# Patient Record
Sex: MALE | Race: WHITE | NOT HISPANIC OR LATINO
[De-identification: names, ages, dates, MRNs, and addresses within clinical notes are randomized per-mention and may not be internally consistent; named-entity substitution may affect disease eponyms.]

---

## 2018-09-26 PROBLEM — Z00.00 ENCOUNTER FOR PREVENTIVE HEALTH EXAMINATION: Status: ACTIVE | Noted: 2018-09-26

## 2018-10-22 ENCOUNTER — APPOINTMENT (OUTPATIENT)
Dept: CARDIOLOGY | Facility: CLINIC | Age: 43
End: 2018-10-22
Payer: COMMERCIAL

## 2018-10-22 ENCOUNTER — NON-APPOINTMENT (OUTPATIENT)
Age: 43
End: 2018-10-22

## 2018-10-22 VITALS
DIASTOLIC BLOOD PRESSURE: 70 MMHG | HEART RATE: 106 BPM | SYSTOLIC BLOOD PRESSURE: 140 MMHG | HEIGHT: 75 IN | BODY MASS INDEX: 29.22 KG/M2 | WEIGHT: 235 LBS

## 2018-10-22 DIAGNOSIS — Z82.49 FAMILY HISTORY OF ISCHEMIC HEART DISEASE AND OTHER DISEASES OF THE CIRCULATORY SYSTEM: ICD-10-CM

## 2018-10-22 DIAGNOSIS — R01.1 CARDIAC MURMUR, UNSPECIFIED: ICD-10-CM

## 2018-10-22 DIAGNOSIS — Z83.3 FAMILY HISTORY OF DIABETES MELLITUS: ICD-10-CM

## 2018-10-22 DIAGNOSIS — R94.31 ABNORMAL ELECTROCARDIOGRAM [ECG] [EKG]: ICD-10-CM

## 2018-10-22 DIAGNOSIS — Z83.438 FAMILY HISTORY OF OTHER DISORDER OF LIPOPROTEIN METABOLISM AND OTHER LIPIDEMIA: ICD-10-CM

## 2018-10-22 DIAGNOSIS — Z82.3 FAMILY HISTORY OF STROKE: ICD-10-CM

## 2018-10-22 DIAGNOSIS — Z86.79 PERSONAL HISTORY OF OTHER DISEASES OF THE CIRCULATORY SYSTEM: ICD-10-CM

## 2018-10-22 DIAGNOSIS — Z78.9 OTHER SPECIFIED HEALTH STATUS: ICD-10-CM

## 2018-10-22 DIAGNOSIS — Z82.0 FAMILY HISTORY OF EPILEPSY AND OTHER DISEASES OF THE NERVOUS SYSTEM: ICD-10-CM

## 2018-10-22 DIAGNOSIS — Z87.891 PERSONAL HISTORY OF NICOTINE DEPENDENCE: ICD-10-CM

## 2018-10-22 PROCEDURE — 93000 ELECTROCARDIOGRAM COMPLETE: CPT

## 2018-10-22 PROCEDURE — 99243 OFF/OP CNSLTJ NEW/EST LOW 30: CPT

## 2018-10-22 RX ORDER — AMLODIPINE BESYLATE 5 MG/1
5 TABLET ORAL DAILY
Refills: 0 | Status: ACTIVE | COMMUNITY

## 2018-10-22 RX ORDER — OMEPRAZOLE 20 MG/1
20 CAPSULE, DELAYED RELEASE ORAL
Refills: 0 | Status: ACTIVE | COMMUNITY

## 2018-11-06 ENCOUNTER — APPOINTMENT (OUTPATIENT)
Dept: CARDIOLOGY | Facility: CLINIC | Age: 43
End: 2018-11-06
Payer: COMMERCIAL

## 2018-11-06 PROCEDURE — 93306 TTE W/DOPPLER COMPLETE: CPT

## 2018-11-06 PROCEDURE — 93015 CV STRESS TEST SUPVJ I&R: CPT

## 2018-11-08 ENCOUNTER — TRANSCRIPTION ENCOUNTER (OUTPATIENT)
Age: 43
End: 2018-11-08

## 2018-11-26 ENCOUNTER — APPOINTMENT (OUTPATIENT)
Dept: CARDIOLOGY | Facility: CLINIC | Age: 43
End: 2018-11-26
Payer: COMMERCIAL

## 2018-11-26 VITALS
HEART RATE: 70 BPM | BODY MASS INDEX: 27.19 KG/M2 | WEIGHT: 235 LBS | SYSTOLIC BLOOD PRESSURE: 150 MMHG | HEIGHT: 78 IN | DIASTOLIC BLOOD PRESSURE: 82 MMHG

## 2018-11-26 PROCEDURE — 99213 OFFICE O/P EST LOW 20 MIN: CPT

## 2019-03-20 ENCOUNTER — TRANSCRIPTION ENCOUNTER (OUTPATIENT)
Age: 44
End: 2019-03-20

## 2019-10-14 ENCOUNTER — APPOINTMENT (OUTPATIENT)
Dept: RADIOLOGY | Facility: CLINIC | Age: 44
End: 2019-10-14
Payer: COMMERCIAL

## 2019-10-14 PROCEDURE — 71046 X-RAY EXAM CHEST 2 VIEWS: CPT

## 2019-11-20 ENCOUNTER — MEDICATION RENEWAL (OUTPATIENT)
Age: 44
End: 2019-11-20

## 2019-12-02 ENCOUNTER — APPOINTMENT (OUTPATIENT)
Dept: CARDIOLOGY | Facility: CLINIC | Age: 44
End: 2019-12-02

## 2020-01-06 ENCOUNTER — NON-APPOINTMENT (OUTPATIENT)
Age: 45
End: 2020-01-06

## 2020-01-06 ENCOUNTER — APPOINTMENT (OUTPATIENT)
Dept: CARDIOLOGY | Facility: CLINIC | Age: 45
End: 2020-01-06
Payer: COMMERCIAL

## 2020-01-06 VITALS
HEIGHT: 75 IN | DIASTOLIC BLOOD PRESSURE: 70 MMHG | WEIGHT: 238 LBS | BODY MASS INDEX: 29.59 KG/M2 | SYSTOLIC BLOOD PRESSURE: 138 MMHG | HEART RATE: 87 BPM | OXYGEN SATURATION: 97 %

## 2020-01-06 PROCEDURE — 93000 ELECTROCARDIOGRAM COMPLETE: CPT

## 2020-01-06 PROCEDURE — 99214 OFFICE O/P EST MOD 30 MIN: CPT

## 2020-01-06 RX ORDER — RAMIPRIL 10 MG/1
10 CAPSULE ORAL
Qty: 180 | Refills: 3 | Status: DISCONTINUED | COMMUNITY
End: 2020-01-06

## 2020-01-06 RX ORDER — LOSARTAN POTASSIUM 50 MG/1
50 TABLET, FILM COATED ORAL DAILY
Qty: 90 | Refills: 1 | Status: ACTIVE | COMMUNITY

## 2020-03-31 ENCOUNTER — APPOINTMENT (OUTPATIENT)
Dept: DISASTER EMERGENCY | Facility: CLINIC | Age: 45
End: 2020-03-31

## 2022-08-05 ENCOUNTER — APPOINTMENT (OUTPATIENT)
Dept: ULTRASOUND IMAGING | Facility: CLINIC | Age: 47
End: 2022-08-05

## 2022-08-05 PROCEDURE — 76700 US EXAM ABDOM COMPLETE: CPT

## 2022-08-05 PROCEDURE — 93880 EXTRACRANIAL BILAT STUDY: CPT

## 2022-11-14 ENCOUNTER — NON-APPOINTMENT (OUTPATIENT)
Age: 47
End: 2022-11-14

## 2022-11-15 ENCOUNTER — APPOINTMENT (OUTPATIENT)
Dept: OPHTHALMOLOGY | Facility: CLINIC | Age: 47
End: 2022-11-15

## 2022-11-15 ENCOUNTER — NON-APPOINTMENT (OUTPATIENT)
Age: 47
End: 2022-11-15

## 2022-11-15 PROCEDURE — 92002 INTRM OPH EXAM NEW PATIENT: CPT

## 2023-01-05 ENCOUNTER — APPOINTMENT (OUTPATIENT)
Dept: INFECTIOUS DISEASE | Facility: CLINIC | Age: 48
End: 2023-01-05
Payer: COMMERCIAL

## 2023-01-05 VITALS
TEMPERATURE: 97.7 F | BODY MASS INDEX: 29.84 KG/M2 | HEIGHT: 75 IN | DIASTOLIC BLOOD PRESSURE: 82 MMHG | SYSTOLIC BLOOD PRESSURE: 158 MMHG | WEIGHT: 240 LBS

## 2023-01-05 DIAGNOSIS — R74.8 ABNORMAL LEVELS OF OTHER SERUM ENZYMES: ICD-10-CM

## 2023-01-05 DIAGNOSIS — R76.8 OTHER SPECIFIED ABNORMAL IMMUNOLOGICAL FINDINGS IN SERUM: ICD-10-CM

## 2023-01-05 DIAGNOSIS — Z11.59 ENCOUNTER FOR SCREENING FOR OTHER VIRAL DISEASES: ICD-10-CM

## 2023-01-05 DIAGNOSIS — Z87.19 PERSONAL HISTORY OF OTHER DISEASES OF THE DIGESTIVE SYSTEM: ICD-10-CM

## 2023-01-05 PROCEDURE — 99204 OFFICE O/P NEW MOD 45 MIN: CPT

## 2023-01-05 RX ORDER — ASPIRIN 81 MG
81 TABLET, DELAYED RELEASE (ENTERIC COATED) ORAL
Refills: 0 | Status: ACTIVE | COMMUNITY

## 2023-01-05 NOTE — HISTORY OF PRESENT ILLNESS
[FreeTextEntry1] : sent from Dr. Samuel.\par \par This 47 y.o. man, former smoker, history of HTN, on losartan/ amlodipine, who is sent here for Positive hepatitis C testing.  He said that a few months ago,. his doctor saw that his labs show elevated LFTs. at that time, je was  checked for hepatitis C , found positive - about 3 or 6 months ago.  \par \par Repeat labs on 12/13/2022 at [facility]:\par hep C RNA reflex testing is not detected\par hep C antibody is weakly reactive\par \par \par He reports No tattoos at all. . No hx of incarceration. NO  service.  Never had injection drugs use.  he had Appendectomy 20 years ago at [facility] - does not remember need for  blood transfusions. \par He is engaged with same female partner for 12 years. Denies hx of paid sex workers.   Denied sex with men. \par \par he drinks occ alcohol; about 6 times a year, and about 2-3 beer per occasion. No hx of blackouts recently. last time  he had a blackout was in 2002. \par \par He quit tobacco in 2013. was smoking off and on 1ppd  x 8 years. \par [place]. \par

## 2023-01-05 NOTE — DATA REVIEWED
[FreeTextEntry1] :   \par \par                                \par  FINKKENDELL AMEZCUA \par \par M \par  Male\par  1975 \par  47 Yrs \par  \par     Order Start Date & Time  12- 16:15  Result Date & Time  12- 03:56  \par     Ordering Clinician  Akshat Samuel  Result Status  Final  \par     Specimen  WB/Miracle list   Last Updated At   [facility]  \par     Observation Date & Time  12- 16:15  Lab Number  [id number]  \par     Specimen Received Date & Time    12- 03:39  Order Number  [id number]  \par        Age at Time of Test    47 Years  \par \par \par  \par \par  \par \par \par  WBC Count 6.89    K/uL  3.80 - 10.50 Final      \par  RBC Count 4.97    M/uL  4.20 - 5.80 Final      \par  Hemoglobin 14.8    g/dL  13.0 - 17.0 Final      \par  Hematocrit 43.6    %  39.0 - 50.0 Final      \par  MCV 87.7    fl  80.0 - 100.0 Final      \par  MCH 29.8    pg  27.0 - 34.0 Final      \par  MCHC 33.9    gm/dL  32.0 - 36.0 Final      \par  RCDW 11.9    %  10.3 - 14.5 Final      \par  Platelet Count - Automated 256    K/uL  150 - 400 Final      \par  Auto Neutrophil % 41.0  Low %  43.0 - 77.0 Final Differential percentages must be correlated with absolute numbers for clinical significance.    \par  Auto Lymphocyte % 45.4  High %  13.0 - 44.0 Final      \par  Auto Monocyte % 9.3    %  2.0 - 14.0 Final      \par  Auto Eosinophil % 2.8    %  0.0 - 6.0 Final      \par  Auto Basophil % 1.2    %  0.0 - 2.0 Final      \par  Auto Immature Granulocyte % 0.3    %  0.0 - 0.9 Final (Includes meta, myelo and promyelocytes). Mild elevations in immature granulocytes may be seen with many inflammatory processes and pregnancy; clinical correlation suggested.    \par  Auto Neutrophil # 2.83    K/uL  1.80 - 7.40 Final      \par  Auto Lymphocyte # 3.13    K/uL  1.00 - 3.30 Final      \par  Auto Monocyte # 0.64    K/uL  0.00 - 0.90 Final      \par  Auto Eosinophil # 0.19    K/uL  0.00 - 0.50 Final      \par  Auto Basophil # 0.08    K/uL  0.00 - 0.20 Final      \par  \par  \par \par  \par  \par \par \par  Sodium, Serum 140    mmol/L  135 - 145 Final      \par  Potassium, Serum 4.9    mmol/L  3.5 - 5.3 Final      \par  Chloride, Serum 101    mmol/L  96 - 108 Final      \par  Carbon Dioxide, Serum 28    mmol/L  22 - 31 Final      \par  Anion Gap, Serum 11    mmol/L  5 - 17 Final      \par  Blood Urea Nitrogen, Serum 16    mg/dL  7 - 23 Final      \par  Creatinine, Serum 0.94    mg/dL  0.50 - 1.30 Final      \par  Glucose, Serum 88    mg/dL  70 - 99 Final      \par  Calcium, Total Serum 9.7    mg/dL  8.4 - 10.5 Final      \par  Protein Total, Serum 7.4    g/dL  6.0 - 8.3 Final      \par  Albumin, Serum 4.3    g/dL  3.3 - 5.0 Final      \par  Bilirubin Total, Serum 0.4    mg/dL  0.2 - 1.2 Final      \par  Alkaline Phosphatase, Serum 66    U/L  40 - 120 Final      \par  Aspartate Aminotransferase (AST/SGOT) 42  High U/L  10 - 40 Final      \par  Alanine Aminotransferase (ALT/SGPT) 47  High U/L  10 - 45 Final      \par  eGFR 102    mL/min/1.73m2  See_Comment Final The estimated glomerular filtration rate (eGFR) is calculated using the 2021 CKD-EPI creatinine equation, which does not have a coefficient for race and is validated in individuals 18 years of age and older (N Engl J Med 2021; 385:7979-4432). Creatinine-based eGFR may be inaccurate in various situations including but not limited to extremes of muscle mass, altered dietary protein intake, or medications that affect renal tubular creatinine secretion. [Automated message] The system which generated this result transmitted reference range: >=60. The reference range was not used to interpret this result as normal/abnormal.    \par   \par  \par Hepatitis C AB\par \par S/CO Ratio Interpretation\par < 1.00 Non-Reactive\par 1.00 - 4.99 Weakly-Reactive\par >= 5.00 Reactive\par \par Non-Reactive: A person with a non-reactive HCV antibody result is considered uninfected. No further action is needed unless recent infection is suspected. In these cases, consider repeat testing later to detect seroconversion..\par \par Weakly-Reactive: HCV antibody test is abnormal, HCV RNA Qualitative test will follow.\par \par Reactive: HCV antibody test is abnormal, HCV RNA Qualitative test will follow.\par \par Note: HCV antibody testing is performed on the [employer]  system.    \par  Hepatitis C Virus S/CO Ratio \par  \par Hepatitis C Virus S/CO Ratio 3.02  High S/CO  0.00 - 0.99 Final \par \par Hepatitis C RNA Qualitative Interp  HCV RNA is not detected; indicates resolved past infection or a false reactive antibody result.          Final CDC and [place] guidelines require confirmation of reactive hepatitis C antibody screening tests for diagnosis of HCV infection. Results should always be viewed in the context of clinical history and other lab tests.    \par  Hepatitis C RNA, Qualitative Not Detected       NotDetec Final    \par

## 2023-01-05 NOTE — PHYSICAL EXAM
[General Appearance - Alert] : alert [General Appearance - In No Acute Distress] : in no acute distress [Sclera] : the sclera and conjunctiva were normal [PERRL With Normal Accommodation] : pupils were equal in size, round, reactive to light [Extraocular Movements] : extraocular movements were intact [Outer Ear] : the ears and nose were normal in appearance [Oropharynx] : the oropharynx was normal with no thrush [Neck Appearance] : the appearance of the neck was normal [Neck Cervical Mass (___cm)] : no neck mass was observed [Jugular Venous Distention Increased] : there was no jugular-venous distention [Thyroid Diffuse Enlargement] : the thyroid was not enlarged [Auscultation Breath Sounds / Voice Sounds] : lungs were clear to auscultation bilaterally [Heart Rate And Rhythm] : heart rate was normal and rhythm regular [Heart Sounds] : normal S1 and S2 [Heart Sounds Gallop] : no gallops [Murmurs] : no murmurs [Heart Sounds Pericardial Friction Rub] : no pericardial rub [Full Pulse] : the pedal pulses are present [Edema] : there was no peripheral edema [Bowel Sounds] : normal bowel sounds [Abdomen Soft] : soft [Abdomen Tenderness] : non-tender [Abdomen Mass (___ Cm)] : no abdominal mass palpated [Costovertebral Angle Tenderness] : no CVA tenderness [No Palpable Adenopathy] : no palpable adenopathy [Musculoskeletal - Swelling] : no joint swelling [Nail Clubbing] : no clubbing  or cyanosis of the fingernails [Motor Tone] : muscle strength and tone were normal [Skin Color & Pigmentation] : normal skin color and pigmentation [] : no rash [FreeTextEntry1] : no tattoos [Cranial Nerves] : cranial nerves 2-12 were intact [Deep Tendon Reflexes (DTR)] : deep tendon reflexes were 2+ and symmetric [Sensation] : the sensory exam was normal to light touch and pinprick [No Focal Deficits] : no focal deficits [Oriented To Time, Place, And Person] : oriented to person, place, and time [Affect] : the affect was normal

## 2023-01-05 NOTE — ASSESSMENT
[FreeTextEntry1] : This 47 y.o. man\par with HTN\par elevated Liver enzymes\par found with borderline elevated Hepatitis C antibody x 2. once in July 2022 and again in December 2022. \par on both occasions, Hepatitis C RNA on reflex was not detected. \par \par patient likely has false positive for Hepatitis C. \par He does not exhibit any significant risk factors for Hep C. \par \par \par Plan:\par LFT are only mildly elevated on recent testing. with AST and ALT slightly above 40. \par - Will check labs today: CBC CMP, \par screen HIV \par check Hep C viral RNA quantitative\par \par \par will call with results.\par If Hep C viral RNA quantitative, then this is a false positive for Hep C. \par \par If liver enzymes remain elevated, he may need further metabolic workup, and/or GI workup. \par \par \par \par

## 2023-01-12 LAB
ALBUMIN SERPL ELPH-MCNC: 4.7 G/DL
ALP BLD-CCNC: 73 U/L
ALT SERPL-CCNC: 55 U/L
ANION GAP SERPL CALC-SCNC: 15 MMOL/L
AST SERPL-CCNC: 31 U/L
BASOPHILS # BLD AUTO: 0.08 K/UL
BASOPHILS NFR BLD AUTO: 1.1 %
BILIRUB SERPL-MCNC: 0.3 MG/DL
BUN SERPL-MCNC: 16 MG/DL
CALCIUM SERPL-MCNC: 9.1 MG/DL
CHLORIDE SERPL-SCNC: 99 MMOL/L
CO2 SERPL-SCNC: 24 MMOL/L
CREAT SERPL-MCNC: 0.88 MG/DL
EGFR: 108 ML/MIN/1.73M2
EOSINOPHIL # BLD AUTO: 0.14 K/UL
EOSINOPHIL NFR BLD AUTO: 1.9 %
GLUCOSE SERPL-MCNC: 95 MG/DL
HCT VFR BLD CALC: 44.2 %
HCV RNA SERPL NAA+PROBE-LOG IU: NOT DETECTED LOGIU/ML
HEPC RNA INTERP: NOT DETECTED
HGB BLD-MCNC: 15.2 G/DL
HIV1+2 AB SPEC QL IA.RAPID: NONREACTIVE
IMM GRANULOCYTES NFR BLD AUTO: 4.8 %
LYMPHOCYTES # BLD AUTO: 2.44 K/UL
LYMPHOCYTES NFR BLD AUTO: 32.4 %
MAN DIFF?: NORMAL
MCHC RBC-ENTMCNC: 29.6 PG
MCHC RBC-ENTMCNC: 34.4 GM/DL
MCV RBC AUTO: 86.2 FL
MONOCYTES # BLD AUTO: 0.56 K/UL
MONOCYTES NFR BLD AUTO: 7.4 %
NEUTROPHILS # BLD AUTO: 3.95 K/UL
NEUTROPHILS NFR BLD AUTO: 52.4 %
PLATELET # BLD AUTO: 296 K/UL
POTASSIUM SERPL-SCNC: 4.2 MMOL/L
PROT SERPL-MCNC: 7.7 G/DL
RBC # BLD: 5.13 M/UL
RBC # FLD: 11.8 %
SODIUM SERPL-SCNC: 138 MMOL/L
WBC # FLD AUTO: 7.53 K/UL

## 2023-02-28 ENCOUNTER — NON-APPOINTMENT (OUTPATIENT)
Age: 48
End: 2023-02-28

## 2023-02-28 ENCOUNTER — APPOINTMENT (OUTPATIENT)
Dept: OPHTHALMOLOGY | Facility: CLINIC | Age: 48
End: 2023-02-28
Payer: COMMERCIAL

## 2023-02-28 PROCEDURE — 92014 COMPRE OPH EXAM EST PT 1/>: CPT

## 2023-08-03 ENCOUNTER — APPOINTMENT (OUTPATIENT)
Dept: PULMONOLOGY | Facility: CLINIC | Age: 48
End: 2023-08-03

## 2023-09-28 ENCOUNTER — APPOINTMENT (OUTPATIENT)
Dept: PULMONOLOGY | Facility: CLINIC | Age: 48
End: 2023-09-28
Payer: COMMERCIAL

## 2023-09-28 VITALS — WEIGHT: 253 LBS | BODY MASS INDEX: 31.62 KG/M2

## 2023-09-28 VITALS
HEART RATE: 82 BPM | OXYGEN SATURATION: 97 % | RESPIRATION RATE: 16 BRPM | DIASTOLIC BLOOD PRESSURE: 70 MMHG | SYSTOLIC BLOOD PRESSURE: 130 MMHG

## 2023-09-28 PROCEDURE — 99204 OFFICE O/P NEW MOD 45 MIN: CPT

## 2023-09-28 RX ORDER — ROSUVASTATIN CALCIUM 5 MG/1
TABLET, FILM COATED ORAL
Refills: 0 | Status: ACTIVE | COMMUNITY

## 2023-10-12 ENCOUNTER — OUTPATIENT (OUTPATIENT)
Dept: OUTPATIENT SERVICES | Facility: HOSPITAL | Age: 48
LOS: 1 days | End: 2023-10-12
Payer: COMMERCIAL

## 2023-10-12 DIAGNOSIS — G47.33 OBSTRUCTIVE SLEEP APNEA (ADULT) (PEDIATRIC): ICD-10-CM

## 2023-10-12 PROCEDURE — 95800 SLP STDY UNATTENDED: CPT

## 2023-10-12 PROCEDURE — 95800 SLP STDY UNATTENDED: CPT | Mod: 26

## 2023-11-02 ENCOUNTER — APPOINTMENT (OUTPATIENT)
Dept: PULMONOLOGY | Facility: CLINIC | Age: 48
End: 2023-11-02
Payer: COMMERCIAL

## 2023-11-02 VITALS
OXYGEN SATURATION: 97 % | BODY MASS INDEX: 31.46 KG/M2 | DIASTOLIC BLOOD PRESSURE: 82 MMHG | WEIGHT: 253 LBS | HEIGHT: 75 IN | HEART RATE: 91 BPM | RESPIRATION RATE: 16 BRPM | SYSTOLIC BLOOD PRESSURE: 126 MMHG

## 2023-11-02 PROCEDURE — 99214 OFFICE O/P EST MOD 30 MIN: CPT

## 2024-01-16 NOTE — ASSESSMENT
[FreeTextEntry1] : Patient with severe obstructive sleep apnea.  I discussed the pathophysiology of obstructive sleep apnea with the patient and its association with the patient's symptomatology and comorbidities.  AutoPap at 6-18 cm H2O  was ordered for the patient and will be delivered to the patient's house.  Appropriate use parameters were discussed with the patient.  Patient will return after being on AutoPap for 6 to 8 weeks so that we can review compliance and efficacy and address any problems the patient may have with AutoPAP.

## 2024-01-16 NOTE — HISTORY OF PRESENT ILLNESS
[TextBox_100] : 10/12/2023 [TextBox_108] : 43 [TextBox_112] : 92 [TextBox_116] : 80 [TextBox_120] : AHI 57 REM, 67 supine, severe snoring. [TextBox_165] : I reviewed the patient's sleep study with the patient.

## 2024-01-17 ENCOUNTER — APPOINTMENT (OUTPATIENT)
Dept: PULMONOLOGY | Facility: CLINIC | Age: 49
End: 2024-01-17
Payer: COMMERCIAL

## 2024-01-17 VITALS
OXYGEN SATURATION: 97 % | WEIGHT: 250 LBS | HEART RATE: 105 BPM | RESPIRATION RATE: 16 BRPM | SYSTOLIC BLOOD PRESSURE: 130 MMHG | HEIGHT: 75 IN | DIASTOLIC BLOOD PRESSURE: 73 MMHG | BODY MASS INDEX: 31.08 KG/M2

## 2024-01-17 PROCEDURE — 99213 OFFICE O/P EST LOW 20 MIN: CPT

## 2024-01-17 NOTE — HISTORY OF PRESENT ILLNESS
[Obstructive Sleep Apnea] : obstructive sleep apnea [Home] : home [TextBox_4] : 1/17/24  My 1st visit with this patient Diagnosed with severe REGINA by Dr Pete (now out on medical leave)  I reviewed all recent notes, orders, lab results and images for 15 minutes on all available platforms (including [employer], [employer], [employer], and [employer] Epic prior to this visit and made notes.  Compliant with and benefiting from nocturnal CPAP Likes the F30i FFM  DME is [facility] [TextBox_100] : 10/12/2023 [TextBox_108] : 43 [TextBox_112] : 92 [TextBox_116] : 80 [TextBox_120] : AHI 57 REM, 67 supine, severe snoring. [TextBox_127] : 12/17/23 [TextBox_129] : 1/15/24 [TextBox_133] : 100 [TextBox_137] : 100 [TextBox_141] : 6 [TextBox_143] : 49 [TextBox_147] : 2.2 [TextBox_165] : I reviewed the patient's sleep study with the patient.

## 2024-01-17 NOTE — CONSULT LETTER
[Dear  ___] : Dear  [unfilled], [Consult Letter:] : I had the pleasure of evaluating your patient, [unfilled]. [Please see my note below.] : Please see my note below. [Consult Closing:] : Thank you very much for allowing me to participate in the care of this patient.  If you have any questions, please do not hesitate to contact me. [Sincerely,] : Sincerely, [DrJudit  ___] : Dr. DOTSON

## 2024-01-17 NOTE — DISCUSSION/SUMMARY
[FreeTextEntry1] : Assess  Obesity Severe REGINA Compliant with and benefiting from nocturnal CPAP  Plan  Weight loss APAP via F30I FFM Supplies via Man 6 month then yearly FU

## 2024-03-07 ENCOUNTER — NON-APPOINTMENT (OUTPATIENT)
Age: 49
End: 2024-03-07

## 2024-03-07 ENCOUNTER — APPOINTMENT (OUTPATIENT)
Dept: OPHTHALMOLOGY | Facility: CLINIC | Age: 49
End: 2024-03-07
Payer: COMMERCIAL

## 2024-03-07 PROCEDURE — 92015 DETERMINE REFRACTIVE STATE: CPT

## 2024-03-07 PROCEDURE — 92014 COMPRE OPH EXAM EST PT 1/>: CPT

## 2024-06-27 ENCOUNTER — APPOINTMENT (OUTPATIENT)
Dept: PULMONOLOGY | Facility: CLINIC | Age: 49
End: 2024-06-27
Payer: COMMERCIAL

## 2024-06-27 VITALS
OXYGEN SATURATION: 98 % | RESPIRATION RATE: 16 BRPM | SYSTOLIC BLOOD PRESSURE: 142 MMHG | DIASTOLIC BLOOD PRESSURE: 78 MMHG | HEART RATE: 79 BPM

## 2024-06-27 DIAGNOSIS — G47.33 OBSTRUCTIVE SLEEP APNEA (ADULT) (PEDIATRIC): ICD-10-CM

## 2024-06-27 PROCEDURE — 99213 OFFICE O/P EST LOW 20 MIN: CPT

## 2025-03-06 ENCOUNTER — NON-APPOINTMENT (OUTPATIENT)
Age: 50
End: 2025-03-06

## 2025-03-06 ENCOUNTER — APPOINTMENT (OUTPATIENT)
Dept: OPHTHALMOLOGY | Facility: CLINIC | Age: 50
End: 2025-03-06
Payer: SELF-PAY

## 2025-03-06 ENCOUNTER — APPOINTMENT (OUTPATIENT)
Dept: OPHTHALMOLOGY | Facility: CLINIC | Age: 50
End: 2025-03-06
Payer: COMMERCIAL

## 2025-03-06 PROCEDURE — 92015 DETERMINE REFRACTIVE STATE: CPT

## 2025-03-06 PROCEDURE — 92014 COMPRE OPH EXAM EST PT 1/>: CPT
